# Patient Record
Sex: MALE | Race: WHITE | URBAN - METROPOLITAN AREA
[De-identification: names, ages, dates, MRNs, and addresses within clinical notes are randomized per-mention and may not be internally consistent; named-entity substitution may affect disease eponyms.]

---

## 2024-10-08 ENCOUNTER — OFFICE VISIT (OUTPATIENT)
Age: 49
End: 2024-10-08
Payer: COMMERCIAL

## 2024-10-08 VITALS — BODY MASS INDEX: 29.82 KG/M2 | WEIGHT: 190 LBS | HEIGHT: 67 IN

## 2024-10-08 DIAGNOSIS — S83.92XA SPRAIN OF LEFT KNEE, UNSPECIFIED LIGAMENT, INITIAL ENCOUNTER: Primary | ICD-10-CM

## 2024-10-08 PROCEDURE — 99204 OFFICE O/P NEW MOD 45 MIN: CPT | Performed by: ORTHOPAEDIC SURGERY

## 2024-10-08 RX ORDER — DICLOFENAC SODIUM 75 MG/1
75 TABLET, DELAYED RELEASE ORAL 2 TIMES DAILY
Qty: 60 TABLET | Refills: 0 | Status: SHIPPED | OUTPATIENT
Start: 2024-10-08 | End: 2024-11-07

## 2024-10-08 NOTE — PROGRESS NOTES
tone  No erythema.   Negative Dial test.    The left knee has a range of motion of 5 to 125 degrees  negative Lachman,  negative anterior drawer,   negative posterior drawer  negative pivot.   Good tibial step-off,   No varus or valgus laxity at 0 or 30 degrees.   Negative lateral joint line tenderness   negative lateral Melody.   Positive medial joint line tenderness  Positive medial Melody.   No evidence of any posterolateral instability.   No patellofemoral pain.   Negative compression,   negative apprehension  Trace effusion.   Calves Are non-tender,  neurovascularly patient is intact.   Negative Homans.   MPFL is non-tender.   Patient Can fully extend the knee.   Good quad tone  No erythema.   Negative Dial test.              Data Reviewed:          XR: AP standing PA 45 degree weightbearing lateral and sunrise views both knees   Clinical Indication    ICD-10-CM    1. Sprain of left knee, unspecified ligament, initial encounter  S83.92XA          Report: AP standing PA 45 degree weightbearing lateral and sunrise views both knees demonstrate a preserved joint space in all 3 compartments in both knees.  There is slight narrowing in the medial compartment of the right knee.  There is hardware in his right knee and postoperative changes consistent with the previous ACL reconstruction    Impression: As above   GUS GROSS JR, MD                  Impression:   1. Sprain of left knee, unspecified ligament, initial encounter       Rule out internal derangement left knee specifically rule out medial meniscal tear  Status post arthroscopy right knee ACL extraction utilizing allograft bone patella tendon bone graft and partial medial meniscectomy 15.5 years  History of chondromalacia right knee    Plan:   I discussed the problem with the patient.  I discussed nonoperative versus operative intervention including injections.  I discussed the indications for arthroscopic left knee intervention.  We will defer surgery

## 2025-03-04 ENCOUNTER — OFFICE VISIT (OUTPATIENT)
Dept: ORTHOPEDIC SURGERY | Age: 50
End: 2025-03-04
Payer: COMMERCIAL

## 2025-03-04 DIAGNOSIS — S83.92XD SPRAIN OF LEFT KNEE, UNSPECIFIED LIGAMENT, SUBSEQUENT ENCOUNTER: Primary | ICD-10-CM

## 2025-03-04 PROCEDURE — 99214 OFFICE O/P EST MOD 30 MIN: CPT | Performed by: ORTHOPAEDIC SURGERY

## 2025-03-04 NOTE — PROGRESS NOTES
Name: Aleksandr Chang  YOB: 1975  Gender: male  MRN: [id number]      What: Left knee pain  How: An injury performing multiple activities including rodeo  When: July 2024        HPI: Aleksandr Chang is a 49 y.o. male seen for left knee problems.  He is very active.  He participates in [employer].  In July 2024 he injured his left knee.  He complains of swelling pain locking catching.  Left knee feels unstable.  He is 15.5 years status post arthroscopy right knee ACL reconstruction utilizing allograft bone patella tendon bone graft and partial medial meniscectomy.  Operative findings notable for chondromalacia right knee.  The right knee is doing fantastic.  The left knee bothers him.  It feels unstable.  It affects his quality of life.  I saw him October 8, 2024 placed him on anti-inflammatories and started him on a rehabilitation program for his left knee.  He is no better.  He complains of medial sided left knee pain with feelings of instability locking and catching      ROS/Meds/PSH/PMH/FH/SH: A ten system review of systems was performed and is negative other than what is in the HPI.   Tobacco:  reports that he has been smoking cigarettes. He started smoking about 42 years ago. He has a 42.2 pack-year smoking history. He has never used smokeless tobacco.  There were no vitals taken for this visit.     Physical Examination:  He is and awake alert pleasant gentleman ambulating with an antalgic gait on the left side      The right knee has a well-healed incision and portals  The right knee has a range of motion of 0 to 135 degrees  negative Lachman,  negative anterior drawer,   negative posterior drawer  negative pivot.   Good tibial step-off,   No varus or valgus laxity at 0 or 30 degrees.   Negative lateral joint line tenderness   negative lateral Melody.   Negative medial joint line tenderness  negative medial Melody.   No evidence of any posterolateral instability.   No patellofemoral

## 2025-03-17 ENCOUNTER — TELEPHONE (OUTPATIENT)
Dept: ORTHOPEDIC SURGERY | Age: 50
End: 2025-03-17

## 2025-03-18 DIAGNOSIS — S83.92XD SPRAIN OF LEFT KNEE, UNSPECIFIED LIGAMENT, SUBSEQUENT ENCOUNTER: Primary | ICD-10-CM

## 2025-03-18 NOTE — TELEPHONE ENCOUNTER
Called and spoke to schedule MRI f/u appt, but pt said he has limited availability and will need to call our office back with good times/dates.

## 2025-03-26 ENCOUNTER — OFFICE VISIT (OUTPATIENT)
Dept: ORTHOPEDIC SURGERY | Age: 50
End: 2025-03-26

## 2025-03-26 DIAGNOSIS — S83.232D COMPLEX TEAR OF MEDIAL MENISCUS OF LEFT KNEE AS CURRENT INJURY, SUBSEQUENT ENCOUNTER: Primary | ICD-10-CM

## 2025-03-26 DIAGNOSIS — M22.42 CHONDROMALACIA OF LEFT PATELLA: ICD-10-CM

## 2025-03-26 DIAGNOSIS — M94.262 CHONDROMALACIA OF LEFT KNEE: ICD-10-CM

## 2025-03-26 RX ORDER — METHYLPREDNISOLONE ACETATE 80 MG/ML
80 INJECTION, SUSPENSION INTRA-ARTICULAR; INTRALESIONAL; INTRAMUSCULAR; SOFT TISSUE ONCE
Status: COMPLETED | OUTPATIENT
Start: 2025-03-26 | End: 2025-03-26

## 2025-03-26 RX ADMIN — METHYLPREDNISOLONE ACETATE 80 MG: 80 INJECTION, SUSPENSION INTRA-ARTICULAR; INTRALESIONAL; INTRAMUSCULAR; SOFT TISSUE at 10:34

## 2025-03-26 NOTE — PROGRESS NOTES
Name: Aleksandr Chang  YOB: 1975  Gender: male  MRN: [id number]            HPI: Aleksandr Chang is a 49 y.o. male seen for left knee problems.  He is very active.  He participates in [employer].  In July 2024 he injured his left knee.  He complains of swelling pain locking catching.  Left knee feels unstable.  He is 15.5 years status post arthroscopy right knee ACL reconstruction utilizing allograft bone patella tendon bone graft and partial medial meniscectomy.  Operative findings notable for chondromalacia right knee.  The right knee is doing fantastic.  The left knee bothers him.  It feels unstable.  It affects his quality of life.  I saw him October 8, 2024 placed him on anti-inflammatories and started him on a rehabilitation program for his left knee.  He is no better.  He complains of medial sided left knee pain with feelings of instability locking and catching      ROS/Meds/PSH/PMH/FH/SH: A ten system review of systems was performed and is negative other than what is in the HPI.   Tobacco:  reports that he has been smoking cigarettes. He started smoking about 42 years ago. He has a 42.2 pack-year smoking history. He has never used smokeless tobacco.  There were no vitals taken for this visit.     Physical Examination:  He is and awake alert pleasant gentleman ambulating with an antalgic gait on the left side      The right knee has a well-healed incision and portals  The right knee has a range of motion of 0 to 135 degrees  negative Lachman,  negative anterior drawer,   negative posterior drawer  negative pivot.   Good tibial step-off,   No varus or valgus laxity at 0 or 30 degrees.   Negative lateral joint line tenderness   negative lateral Melody.   Negative medial joint line tenderness  negative medial Melody.   No evidence of any posterolateral instability.   No patellofemoral pain.   Negative compression,   negative apprehension  no effusion.   Calves Are

## 2025-05-12 ENCOUNTER — OFFICE VISIT (OUTPATIENT)
Dept: ORTHOPEDIC SURGERY | Age: 50
End: 2025-05-12
Payer: COMMERCIAL

## 2025-05-12 DIAGNOSIS — M22.42 CHONDROMALACIA OF LEFT PATELLA: ICD-10-CM

## 2025-05-12 DIAGNOSIS — S83.232D COMPLEX TEAR OF MEDIAL MENISCUS OF LEFT KNEE AS CURRENT INJURY, SUBSEQUENT ENCOUNTER: Primary | ICD-10-CM

## 2025-05-12 DIAGNOSIS — M94.262 CHONDROMALACIA OF LEFT KNEE: ICD-10-CM

## 2025-05-12 PROCEDURE — 99214 OFFICE O/P EST MOD 30 MIN: CPT | Performed by: ORTHOPAEDIC SURGERY

## 2025-05-12 RX ORDER — METHYLPREDNISOLONE 4 MG/1
TABLET ORAL
Qty: 1 KIT | Refills: 0 | Status: SHIPPED | OUTPATIENT
Start: 2025-05-12

## 2025-05-12 NOTE — PROGRESS NOTES
Name: Aleksandr Chang  YOB: 1975  Gender: male  MRN: [id number]            HPI: Aleksandr Chang is a 49 y.o. male seen for left knee problems.  He is very active.  He participates in [employer].  In July 2024 he injured his left knee.  He complains of swelling pain locking catching.  Left knee feels unstable.  He is 15.5 years status post arthroscopy right knee ACL reconstruction utilizing allograft bone patella tendon bone graft and partial medial meniscectomy.  Operative findings notable for chondromalacia right knee.  The right knee is doing fantastic.  The left knee bothers him.  It feels unstable.  It affects his quality of life.  I saw him October 8, 2024 placed him on anti-inflammatories and started him on a rehabilitation program for his left knee.  He is no better.  He complains of medial sided left knee pain with feelings of instability locking and catching    He returns and notes that the left knee injection I gave him the last time helped but his left knee is giving him trouble he is looking to do surgery in October      ROS/Meds/PSH/PMH/FH/SH: A ten system review of systems was performed and is negative other than what is in the HPI.   Tobacco:  reports that he has been smoking cigarettes. He started smoking about 42 years ago. He has a 42.4 pack-year smoking history. He has never used smokeless tobacco.  There were no vitals taken for this visit.     Physical Examination:  He is and awake alert pleasant gentleman ambulating with an antalgic gait on the left side      The right knee has a well-healed incision and portals  The right knee has a range of motion of 0 to 135 degrees  negative Lachman,  negative anterior drawer,   negative posterior drawer  negative pivot.   Good tibial step-off,   No varus or valgus laxity at 0 or 30 degrees.   Negative lateral joint line tenderness   negative lateral Melody.   Negative medial joint line tenderness  negative medial Melody.   No